# Patient Record
Sex: FEMALE | Race: WHITE | URBAN - METROPOLITAN AREA
[De-identification: names, ages, dates, MRNs, and addresses within clinical notes are randomized per-mention and may not be internally consistent; named-entity substitution may affect disease eponyms.]

---

## 2017-02-03 ENCOUNTER — OFFICE VISIT - RIVER FALLS (OUTPATIENT)
Dept: FAMILY MEDICINE | Facility: CLINIC | Age: 27
End: 2017-02-03

## 2017-02-03 ASSESSMENT — MIFFLIN-ST. JEOR: SCORE: 1783.39

## 2022-02-11 VITALS — TEMPERATURE: 98.6 F | WEIGHT: 235.8 LBS | HEIGHT: 66 IN | BODY MASS INDEX: 37.9 KG/M2

## 2022-02-15 NOTE — PROGRESS NOTES
Patient:   SAMI RATLIFF            MRN: [id number]            FIN: [id number]               Age:   26 years     Sex:  Female     :  1990   Associated Diagnoses:   None   Author:   Cornel Gramajo MD      Visit Information      Date of Service: 2017 02:11 pm  Performing Location: [facility]  Encounter#: [id number]      Primary Care Provider (PCP):  Not recorded.      Referring Provider:  No referring provider recorded for selected visit.      Chief Complaint   2/3/2017 2:53 PM CST     pt here for consult for right side facial swelling, states it was swollen she was put on anibiotics for this, the swelling came back after being treated, she say's it is quite painful when it is swollen        History of Present Illness   Asked to see by Deepak Sneed for evaluation right facial swelling.   In  she had swelling in the right cheek that responded to antibiortics.  No imaging studies were done.  It reoccurred again last week.  She was treated with another course of antibiotics and the symptoms went away.  She woke up with the sweeling.  No change with eating.        Review of Systems   Constitutional:  Negative.    Ear/Nose/Mouth/Throat:  Negative except as documented in history of present illness.    Respiratory:  Negative.       Health Status   Allergies:    Allergic Reactions (Selected)  No Known Medication Allergies   Medications:  (Selected)   Documented Medications  Documented  Mariah 0.15 mg-30 mcg oral tablet: 1 tab(s), po, daily, 0 Refill(s), Type: Maintenance   Problem list:    All Problems  Obesity / SNOMED CT [id number] / Probable      Histories   Past Medical History:    Resolved  History of chickenpox ([id number]):  Resolved.   Family History:    No family history items have been selected or recorded.   Procedure history:    No active procedure history items have been selected or recorded.      Physical Examination   Vital Signs   2/3/2017 2:53 PM CST     Temperature Tympanic       98.6 DegF     Measurements from flowsheet : Measurements   2/3/2017 2:53 PM CST Height Measured - Standard 65.5 in    Weight Measured - Standard 235.8 lb    BSA 2.22 m2    Body Mass Index 38.64 kg/m2      CONSTITUTIONAL  General Appearance:  Normal, well developed, well nourished, no obvious distress  Ability to Communicate:  communicates appropriately.    HEAD AND FACE  Appearance and Symmetry:  Normal, no scalp or facial scarring or suspicious lesions.  Paranasal sinuses tenderness:  Normal, Paranasal sinuses non tender    EARS  Clinical speech reception threshold:  Normal, able to hear normal speech.  Auricle:  Normal, Auricles without scars, lesions, masses.  External auditory canal:  Normal, External auditory canal normal.  Tympanic membrane:  Normal, Tympanic membranes normal without swelling or erythema.  Tympanic membrane mobility:  Normal, Normal tympanic membrane mobility.    NOSE (speculum or scope)  Architecture:  Normal, Grossly normal external nasal architecture with no masses or lesions.  Mucosa:  Normal mucosa, No polyps or masses.  Septum:  Normal, Septum non-obstructing.  Turbinates:  Normal, No turbinate abnormalities    ORAL CAVITY AND OROPHARYNX  Lips:  Normal.  Dental and gingiva:  Normal, No obvious dental or gingival disease.  Mucosa:  Normal, Moist mucous membranes.  Tongue:  Normal, Tongue mobile with no mucosal abnormalities  Hard and soft palate:  Normal, Hard and soft palate without cleft or mucosal lesions.  Oral pharynx:  Normal, Posterior pharynx without lesions or remarkable asymmetry.  Saliva:  Normal, Clear saliva.  Masses:  Normal, No palpable masses or pathologically enlarged lymph nodes.    NECK  Masses/lymph nodes:  Normal, No worrisome neck masses or lymph nodes.  Salivary glands:  Normal, Parotid and submandibular glands.  Trachea and larynx position:  Normal, Trachea and larynx midline.  Thyroid:  Normal, No thyroid abnormality.  Tenderness:  Normal, No cervical  tenderness.  Suppleness:  Normal, Neck supple    NEUROLOGICAL  Speech pattern:  Normal, Proasaic    RESPIRATORY  Symmetry and Respiratory effort:  Normal, Symmetric chest movement and expansion with no increased intercostal retractions or use of accessory muscles.       Impression and Plan   Negative exam today.  No evidence of parotid swelling.  From the location and description it certainly sounds like parotid swelling.  I advised a CT soft tissue neck.